# Patient Record
Sex: MALE | Race: WHITE
[De-identification: names, ages, dates, MRNs, and addresses within clinical notes are randomized per-mention and may not be internally consistent; named-entity substitution may affect disease eponyms.]

---

## 2018-04-28 ENCOUNTER — HOSPITAL ENCOUNTER (INPATIENT)
Dept: HOSPITAL 17 - PHED | Age: 43
LOS: 2 days | Discharge: HOME | DRG: 190 | End: 2018-04-30
Attending: HOSPITALIST | Admitting: HOSPITALIST
Payer: COMMERCIAL

## 2018-04-28 VITALS
HEART RATE: 120 BPM | OXYGEN SATURATION: 95 % | SYSTOLIC BLOOD PRESSURE: 134 MMHG | RESPIRATION RATE: 23 BRPM | DIASTOLIC BLOOD PRESSURE: 72 MMHG

## 2018-04-28 VITALS — BODY MASS INDEX: 19.75 KG/M2 | HEIGHT: 68 IN | WEIGHT: 130.29 LBS

## 2018-04-28 VITALS
RESPIRATION RATE: 28 BRPM | OXYGEN SATURATION: 90 % | DIASTOLIC BLOOD PRESSURE: 84 MMHG | TEMPERATURE: 98.5 F | SYSTOLIC BLOOD PRESSURE: 118 MMHG | HEART RATE: 130 BPM

## 2018-04-28 VITALS — OXYGEN SATURATION: 94 %

## 2018-04-28 VITALS — OXYGEN SATURATION: 95 %

## 2018-04-28 DIAGNOSIS — E87.2: ICD-10-CM

## 2018-04-28 DIAGNOSIS — G80.9: ICD-10-CM

## 2018-04-28 DIAGNOSIS — J44.0: ICD-10-CM

## 2018-04-28 DIAGNOSIS — J18.9: ICD-10-CM

## 2018-04-28 DIAGNOSIS — J96.01: ICD-10-CM

## 2018-04-28 DIAGNOSIS — J44.1: Primary | ICD-10-CM

## 2018-04-28 DIAGNOSIS — Z91.048: ICD-10-CM

## 2018-04-28 DIAGNOSIS — F17.210: ICD-10-CM

## 2018-04-28 DIAGNOSIS — E87.6: ICD-10-CM

## 2018-04-28 LAB
BASOPHILS # BLD AUTO: 0.1 TH/MM3 (ref 0–0.2)
BASOPHILS NFR BLD: 0.5 % (ref 0–2)
BUN SERPL-MCNC: 8 MG/DL (ref 7–18)
CALCIUM SERPL-MCNC: 8.7 MG/DL (ref 8.5–10.1)
CHLORIDE SERPL-SCNC: 102 MEQ/L (ref 98–107)
CREAT SERPL-MCNC: 1 MG/DL (ref 0.6–1.3)
D-DIMER: 1.12 MG/L FEU (ref 0–0.5)
EOSINOPHIL # BLD: 0.7 TH/MM3 (ref 0–0.4)
EOSINOPHIL NFR BLD: 4.1 % (ref 0–4)
ERYTHROCYTE [DISTWIDTH] IN BLOOD BY AUTOMATED COUNT: 13.1 % (ref 11.6–17.2)
GFR SERPLBLD BASED ON 1.73 SQ M-ARVRAT: 82 ML/MIN (ref 89–?)
GLUCOSE SERPL-MCNC: 134 MG/DL (ref 74–106)
HCO3 BLD-SCNC: 27.5 MEQ/L (ref 21–32)
HCT VFR BLD CALC: 43.9 % (ref 39–51)
HGB BLD-MCNC: 15.4 GM/DL (ref 13–17)
INR PPP: 1.1 RATIO
LACTIC ACID SEPSIS PROTOCOL: 2.2 MMOL/L (ref 0.4–2)
LYMPHOCYTES # BLD AUTO: 3.3 TH/MM3 (ref 1–4.8)
LYMPHOCYTES NFR BLD AUTO: 18 % (ref 9–44)
MAGNESIUM SERPL-MCNC: 2.1 MG/DL (ref 1.5–2.5)
MCH RBC QN AUTO: 32.7 PG (ref 27–34)
MCHC RBC AUTO-ENTMCNC: 35.2 % (ref 32–36)
MCV RBC AUTO: 92.9 FL (ref 80–100)
MONOCYTE #: 1.2 TH/MM3 (ref 0–0.9)
MONOCYTES NFR BLD: 6.7 % (ref 0–8)
NEUTROPHILS # BLD AUTO: 13 TH/MM3 (ref 1.8–7.7)
NEUTROPHILS NFR BLD AUTO: 70.7 % (ref 16–70)
PLATELET # BLD: 269 TH/MM3 (ref 150–450)
PMV BLD AUTO: 8.9 FL (ref 7–11)
PROTHROMBIN TIME: 11 SEC (ref 9.8–11.6)
RBC # BLD AUTO: 4.72 MIL/MM3 (ref 4.5–5.9)
SODIUM SERPL-SCNC: 137 MEQ/L (ref 136–145)
WBC # BLD AUTO: 18.3 TH/MM3 (ref 4–11)

## 2018-04-28 PROCEDURE — 80048 BASIC METABOLIC PNL TOTAL CA: CPT

## 2018-04-28 PROCEDURE — 85610 PROTHROMBIN TIME: CPT

## 2018-04-28 PROCEDURE — 71275 CT ANGIOGRAPHY CHEST: CPT

## 2018-04-28 PROCEDURE — 96368 THER/DIAG CONCURRENT INF: CPT

## 2018-04-28 PROCEDURE — 87804 INFLUENZA ASSAY W/OPTIC: CPT

## 2018-04-28 PROCEDURE — 94640 AIRWAY INHALATION TREATMENT: CPT

## 2018-04-28 PROCEDURE — 85730 THROMBOPLASTIN TIME PARTIAL: CPT

## 2018-04-28 PROCEDURE — 93005 ELECTROCARDIOGRAM TRACING: CPT

## 2018-04-28 PROCEDURE — 80053 COMPREHEN METABOLIC PANEL: CPT

## 2018-04-28 PROCEDURE — 83036 HEMOGLOBIN GLYCOSYLATED A1C: CPT

## 2018-04-28 PROCEDURE — 82805 BLOOD GASES W/O2 SATURATION: CPT

## 2018-04-28 PROCEDURE — 85025 COMPLETE CBC W/AUTO DIFF WBC: CPT

## 2018-04-28 PROCEDURE — 96365 THER/PROPH/DIAG IV INF INIT: CPT

## 2018-04-28 PROCEDURE — 85379 FIBRIN DEGRADATION QUANT: CPT

## 2018-04-28 PROCEDURE — 83605 ASSAY OF LACTIC ACID: CPT

## 2018-04-28 PROCEDURE — 36600 WITHDRAWAL OF ARTERIAL BLOOD: CPT

## 2018-04-28 PROCEDURE — 83735 ASSAY OF MAGNESIUM: CPT

## 2018-04-28 PROCEDURE — 87040 BLOOD CULTURE FOR BACTERIA: CPT

## 2018-04-28 PROCEDURE — 71046 X-RAY EXAM CHEST 2 VIEWS: CPT

## 2018-04-28 PROCEDURE — 83880 ASSAY OF NATRIURETIC PEPTIDE: CPT

## 2018-04-28 PROCEDURE — 94664 DEMO&/EVAL PT USE INHALER: CPT

## 2018-04-28 RX ADMIN — MAGNESIUM SULFATE IN DEXTROSE SCH MLS/HR: 10 INJECTION, SOLUTION INTRAVENOUS at 23:13

## 2018-04-28 RX ADMIN — ENOXAPARIN SODIUM SCH MG: 40 INJECTION SUBCUTANEOUS at 23:11

## 2018-04-28 RX ADMIN — IPRATROPIUM BROMIDE AND ALBUTEROL SULFATE SCH AMPULE: .5; 3 SOLUTION RESPIRATORY (INHALATION) at 22:17

## 2018-04-28 RX ADMIN — IPRATROPIUM BROMIDE AND ALBUTEROL SULFATE SCH AMPULE: .5; 3 SOLUTION RESPIRATORY (INHALATION) at 22:26

## 2018-04-28 RX ADMIN — MAGNESIUM SULFATE IN DEXTROSE SCH MLS/HR: 10 INJECTION, SOLUTION INTRAVENOUS at 22:02

## 2018-04-28 NOTE — EKG
Date Performed: 04/28/2018       Time Performed: 22:16:26

 

PTAGE:      42 years

 

EKG:      SINUS TACHYCARDIA POSSIBLE LEFT ATRIAL ENLARGEMENT PATTERN CONSISTENT WITH PULMONARY DISEAS
E POSSIBLE RIGHT VENTRICULAR HYPERTROPHY ABNORMAL ECG

 

PREVIOUS TRACING       : 03/31/2016 10.30 Since the previous tracing, no significant change noted

 

DOCTOR:   Clark Mukherjee  Interpretating Date/Time  04/28/2018 23:54:06

## 2018-04-28 NOTE — RADRPT
EXAM DATE/TIME:  04/28/2018 21:46 

 

[facility] COMPARISON:     

CHEST PA & LAT, March 31, 2016, 10:42.

 

                     

INDICATIONS :     

Short of breath.

                     

 

MEDICAL HISTORY :            

Cerebral palsy   

 

SURGICAL HISTORY :     

None.   

 

ENCOUNTER:     

Initial                                        

 

ACUITY:     

2 days      

 

PAIN SCORE:     

0/10

 

LOCATION:     

Bilateral chest 

 

FINDINGS:     

PA and lateral views of the chest demonstrate bibasilar patchy densities. Right upper lobe nodule par
tially calcified. Heart normal size. Osseous structures are intact.

 

CONCLUSION:     

1. Bibasilar infiltrates. Treatment and followup to resolution.

 

 

 

 Abelardo Burch MD on April 28, 2018 at 22:04           

Board Certified Radiologist.

 This report was verified electronically.

## 2018-04-28 NOTE — PD
HPI


Chief Complaint:  Respiratory Symptoms


Time Seen by Provider:  21:33


Travel History


International Travel<30 days:  No


Contact w/Intl Traveler<30days:  No


Traveled to known affect area:  No





History of Present Illness


HPI


Patient is a 42-year-old male who presents the emergency room with complaints 

of shortness of breath.  Patient reports that for the past 2 days, he has been 

having a nonproductive cough, reports that he has been wheezing.  Patient 

reports that symptoms were exacerbated tonight, he decided to call ems for 

help.  EMS reports that patient was hypoxic and tachypneic on initial evaluation

, patient was given Solu-Medrol 125 mg IM as well as 3 neb treatments prior to 

arrival to the emergency room. His initial pulse ox was 80% on room air by EMS.

   Patient reports that he is a smoker, reports that he has not smoked in the 

past 2 days due to the shortness of breath.  Patient denies any fevers or chills

, denies any history of pulmonary embolism or DVT.  Reports no sick contacts.





PFSH


Past Medical History


Cerebral Palsy:  Yes


Diabetes:  No


Diminished Hearing:  No


Tetanus Vaccination:  < 5 Years


Influenza Vaccination:  Yes





Past Surgical History


Other Surgery:  Yes (BUNION SURGERY)





Social History


Alcohol Use:  No


Tobacco Use:  Yes (1 ppd)


Substance Use:  No





Allergies-Medications


(Allergen,Severity, Reaction):  


Coded Allergies:  


     No Known Allergies (Verified  Allergy, Unknown, 4/28/18)


Uncoded Allergies:  


     NICKEL (Allergy, Intermediate, BLISTERS/HIVES, 3/31/16)


Reported Meds & Prescriptions





Reported Meds & Active Scripts


Active


No Active Prescriptions or Reported Medications    








Review of Systems


General / Constitutional:  No: Fever, Chills


Eyes:  No: Visual changes


HENT:  No: Headaches


Cardiovascular:  No: Chest Pain or Discomfort


Respiratory:  Positive: Cough, Shortness of Breath, Wheezing


Gastrointestinal:  No: Abdominal Pain


Genitourinary:  No: Dysuria


Musculoskeletal:  No: Pain


Skin:  No Rash


Neurologic:  No: Weakness


Psychiatric:  No: Depression


Endocrine:  No: Polydipsia


Hematologic/Lymphatic:  No: Easy Bruising





Physical Exam


Narrative


GENERAL: Moderate distress


SKIN: Focused skin assessment warm/dry.


HEAD: Atraumatic. Normocephalic. 


EYES: Pupils equal and round. No scleral icterus. No injection or drainage. 


ENT: No nasal bleeding or discharge.  Mucous membranes pink and moist.


NECK: Trachea midline. No JVD. 


CARDIOVASCULAR: Tachycardic.  No murmur appreciated.


RESPIRATORY: Positive accessory muscle use.  Scattered wheezing diffusely


GASTROINTESTINAL: Abdomen soft, non-tender, nondistended. Hepatic and splenic 

margins not palpable. 


MUSCULOSKELETAL: No obvious deformities. No clubbing.  No cyanosis.  No edema. 


NEUROLOGICAL: Awake and alert. No obvious cranial nerve deficits.  Motor 

grossly within normal limits. Normal speech.


PSYCHIATRIC: Appropriate mood and affect; insight and judgment normal.





Data


Data


Last Documented VS





Vital Signs








  Date Time  Temp Pulse Resp B/P (MAP) Pulse Ox O2 Delivery O2 Flow Rate FiO2


 


4/28/18 22:18     94 Nasal Cannula 4.00 


 


4/28/18 21:33 98.5 130 28 118/84 (95)    








Orders





 Orders


Complete Blood Count With Diff (4/28/18 21:38)


Basic Metabolic Panel (Bmp) (4/28/18 21:38)


B-Type Natriuretic Peptide (4/28/18 21:38)


D-Dimer (4/28/18 21:38)


Act Partial Throm Time (Ptt) (4/28/18 21:38)


Prothrombin Time / Inr (Pt) (4/28/18 21:38)


Magnesium (Mg) (4/28/18 21:38)


Arterial Blood Gas (Abg) (4/28/18 21:38)


Influenzae A/B Antigen (4/28/18 21:38)


Iv Access Insert/Monitor (4/28/18 21:38)


Electrocardiogram (4/28/18 21:38)


Ecg Monitoring (4/28/18 21:38)


Oximetry (4/28/18 21:38)


Chest, Pa & Lat (4/28/18 21:38)


Sodium Chloride 0.9% Flush (Ns Flush) (4/28/18 21:45)


Albuterol-Ipratropium Neb (Duoneb Neb) (4/28/18 21:45)


Sodium Chlor 0.9% 1000 Ml Inj (Ns 1000 M (4/28/18 21:45)


Blood Culture (4/28/18 21:38)


Lactic Acid Sepsis Protocol (4/28/18 21:40)


Magnesium Sulfate 1 Gm Premix (Magnesium (4/28/18 22:00)


Azithromycin Inj (Zithromax Inj) (4/28/18 22:15)


Ceftriaxone Inj (Rocephin Inj) (4/28/18 22:15)


Sodium Chlor 0.9% 1000 Ml Inj (Ns 1000 M (4/28/18 22:30)


Sepsis Workup Initiated (4/28/18 )


Potassium Chloride (Kcl) (4/28/18 22:45)


Kcl Bolus Inj (4/28/18 22:45)





Labs





Laboratory Tests








Test


  4/28/18


21:45 4/28/18


21:55


 


White Blood Count 18.3 TH/MM3  


 


Red Blood Count 4.72 MIL/MM3  


 


Hemoglobin 15.4 GM/DL  


 


Hematocrit 43.9 %  


 


Mean Corpuscular Volume 92.9 FL  


 


Mean Corpuscular Hemoglobin 32.7 PG  


 


Mean Corpuscular Hemoglobin


Concent 35.2 % 


  


 


 


Red Cell Distribution Width 13.1 %  


 


Platelet Count 269 TH/MM3  


 


Mean Platelet Volume 8.9 FL  


 


Neutrophils (%) (Auto) 70.7 %  


 


Lymphocytes (%) (Auto) 18.0 %  


 


Monocytes (%) (Auto) 6.7 %  


 


Eosinophils (%) (Auto) 4.1 %  


 


Basophils (%) (Auto) 0.5 %  


 


Neutrophils # (Auto) 13.0 TH/MM3  


 


Lymphocytes # (Auto) 3.3 TH/MM3  


 


Monocytes # (Auto) 1.2 TH/MM3  


 


Eosinophils # (Auto) 0.7 TH/MM3  


 


Basophils # (Auto) 0.1 TH/MM3  


 


CBC Comment DIFF FINAL  


 


Differential Comment   


 


Blood Urea Nitrogen 8 MG/DL  


 


Creatinine 1.00 MG/DL  


 


Random Glucose 134 MG/DL  


 


Calcium Level 8.7 MG/DL  


 


Magnesium Level 2.1 MG/DL  


 


Sodium Level 137 MEQ/L  


 


Potassium Level 2.7 MEQ/L  


 


Chloride Level 102 MEQ/L  


 


Carbon Dioxide Level 27.5 MEQ/L  


 


Anion Gap 8 MEQ/L  


 


Estimat Glomerular Filtration


Rate 82 ML/MIN 


  


 


 


Lactic Acid Level 2.2 mmol/L  


 


B-Type Natriuretic Peptide 18 PG/ML  


 


Blood Gas Puncture Site  RT BRACHIAL 


 


Blood Gas Patient Temperature  98.6 


 


Blood Gas HCO3  24 mmol/L 


 


Blood Gas Base Excess  0.8 mmol/L 


 


Blood Gas Oxygen Saturation  87 % 


 


Arterial Blood pH  7.46 


 


Arterial Blood Partial


Pressure CO2 


  34 mmHG 


 


 


Arterial Blood Partial


Pressure O2 


  55 mmHG 


 


 


Arterial Blood Oxygen Content  19.3 Vol % 


 


Arterial Blood


Carboxyhemoglobin 


  1.9 % 


 


 


Arterial Blood Methemoglobin  1.0 % 


 


Blood Gas Hemoglobin  15.8 G/DL 


 


Blood Gas Inspired Oxygen  21 % 











MDM


Medical Decision Making


Medical Screen Exam Complete:  Yes


Emergency Medical Condition:  Yes


Medical Record Reviewed:  Yes


Interpretation(s)


EKG at 2216: Sinus tach at 116bpm, qt/qtc: 327/396,





Vital Signs








  Date Time  Temp Pulse Resp B/P (MAP) Pulse Ox O2 Delivery O2 Flow Rate FiO2


 


4/28/18 21:36      Nasal Cannula 4.00 


 


4/28/18 21:33 98.5 130 28 118/84 (95) 90   








Differential Diagnosis


Pneumonia, COPD exacerbation, influenza, pulmonary embolism, pneumothorax


Narrative Course


42-year-old male who presents the emergency room complaints of shortness of 

breath, cough and wheezing for the past 2 days.





Patient arrives to the emergency room tachycardic with heart rate in the 130s, 

tachypneic with a respiratory rate of 28, hypoxic with a pulse ox of 90% on 4 L 

nasal cannula.


Patient does meet SIRS criteria, blood cultures, lactic acid as well as IV 

fluids were ordered.





During the course of the patients emergency department visit, the patients 

history, examination, and differential diagnosis were reviewed with the 

patient. The patient was placed on a cardiac monitor with oximetry and frequent 

blood pressure monitoring. The patient had an IV access obtained and blood work 

sent for analysis. 





The patient was initially provided IM Solu-Medrol by EMS, 3 neb treatments by 

EMS.





Patient will receive 3 DuoNeb's in the emergency room as well as IV magnesium.





The patients laboratory studies were reviewed and remarkable for





CBC & BMP Diagram


4/28/18 21:45








Calcium Level 8.7, Magnesium Level 2.1





lactate 2.2





Radiology studies were reviewed and remarkable for 








Last Impressions








Chest X-Ray 4/28/18 2138 Signed





Impressions: 





 Service Date/Time:  Saturday, April 28, 2018 21:46 - CONCLUSION:  1. Bibasilar 





 infiltrates. Treatment and followup to resolution.     Abelardo Burch MD 





Patient with bilateral infiltrates, he has a lactic acid of 2.2, a white blood 

cell count of 18.3, he is hypoxic with a pulse ox of 80% on room air, ABG shows 

that his pO2 55.  





patient will require admission to the hospital for iv antibiotics.  He has 

received a dose of IV Rocephin as well as IV azithromycin.  Patient is 

agreeable to admission to hospital.


Critical Care Narrative


Aggregate critical care time was 30 minutes. Time to perform other separately 

billable procedures was not  


included in the critical care time. My time did not include minutes spent 

treating any other patients simultaneously or on  


activities that did not directly contribute to the patient's treatment.  





The services I provided to this patient were to treat and/or prevent clinically 

significant deterioration that could result  


in:  death, decompensation, deterioration





I provided critical care services requiring my management, as noted below:


Chart data review, documentation time, medication orders and management, vital 

sign assessments/reviewing monitor data,  


ordering and reviewing lab tests, ordering and interpreting/reviewing x-rays 

and diagnostic studies, care of the patient  


and discussion of the patient with the admitting physicians.





Sepsis Criteria


SIRS Criteria (2 or more):  Heart rate over 90, RR  > 20 or PaCO2 < 32





Diagnosis





 Primary Impression:  


 COPD exacerbation


 Additional Impressions:  


 Hypoxia


 Hypokalemia





Admitting Information


Admitting Physician Requests:  Admit


Scripts


No Active Prescriptions or Reported Tia Kerns DO Apr 28, 2018 21:51

## 2018-04-29 VITALS — DIASTOLIC BLOOD PRESSURE: 69 MMHG | SYSTOLIC BLOOD PRESSURE: 102 MMHG

## 2018-04-29 VITALS — RESPIRATION RATE: 28 BRPM | HEART RATE: 88 BPM | DIASTOLIC BLOOD PRESSURE: 76 MMHG | SYSTOLIC BLOOD PRESSURE: 119 MMHG

## 2018-04-29 VITALS — HEART RATE: 84 BPM | DIASTOLIC BLOOD PRESSURE: 76 MMHG | SYSTOLIC BLOOD PRESSURE: 119 MMHG | RESPIRATION RATE: 28 BRPM

## 2018-04-29 VITALS — HEART RATE: 88 BPM | OXYGEN SATURATION: 95 % | RESPIRATION RATE: 29 BRPM

## 2018-04-29 VITALS
TEMPERATURE: 97.8 F | OXYGEN SATURATION: 98 % | RESPIRATION RATE: 22 BRPM | DIASTOLIC BLOOD PRESSURE: 68 MMHG | HEART RATE: 92 BPM | SYSTOLIC BLOOD PRESSURE: 108 MMHG

## 2018-04-29 VITALS
SYSTOLIC BLOOD PRESSURE: 117 MMHG | HEART RATE: 132 BPM | DIASTOLIC BLOOD PRESSURE: 67 MMHG | RESPIRATION RATE: 30 BRPM | OXYGEN SATURATION: 93 % | TEMPERATURE: 98.6 F

## 2018-04-29 VITALS — RESPIRATION RATE: 29 BRPM | HEART RATE: 94 BPM

## 2018-04-29 VITALS
DIASTOLIC BLOOD PRESSURE: 49 MMHG | OXYGEN SATURATION: 97 % | RESPIRATION RATE: 28 BRPM | SYSTOLIC BLOOD PRESSURE: 86 MMHG | HEART RATE: 102 BPM

## 2018-04-29 VITALS
TEMPERATURE: 97.8 F | HEART RATE: 100 BPM | SYSTOLIC BLOOD PRESSURE: 124 MMHG | RESPIRATION RATE: 24 BRPM | OXYGEN SATURATION: 97 % | DIASTOLIC BLOOD PRESSURE: 75 MMHG

## 2018-04-29 VITALS
SYSTOLIC BLOOD PRESSURE: 116 MMHG | DIASTOLIC BLOOD PRESSURE: 63 MMHG | RESPIRATION RATE: 28 BRPM | OXYGEN SATURATION: 93 % | HEART RATE: 98 BPM

## 2018-04-29 VITALS — HEART RATE: 112 BPM

## 2018-04-29 VITALS
DIASTOLIC BLOOD PRESSURE: 48 MMHG | HEART RATE: 100 BPM | RESPIRATION RATE: 29 BRPM | OXYGEN SATURATION: 96 % | SYSTOLIC BLOOD PRESSURE: 102 MMHG

## 2018-04-29 VITALS — OXYGEN SATURATION: 98 % | RESPIRATION RATE: 19 BRPM | HEART RATE: 86 BPM

## 2018-04-29 VITALS
SYSTOLIC BLOOD PRESSURE: 114 MMHG | DIASTOLIC BLOOD PRESSURE: 68 MMHG | RESPIRATION RATE: 26 BRPM | HEART RATE: 90 BPM | OXYGEN SATURATION: 95 %

## 2018-04-29 VITALS — HEART RATE: 100 BPM

## 2018-04-29 VITALS — DIASTOLIC BLOOD PRESSURE: 65 MMHG | SYSTOLIC BLOOD PRESSURE: 109 MMHG | RESPIRATION RATE: 27 BRPM | HEART RATE: 104 BPM

## 2018-04-29 VITALS — OXYGEN SATURATION: 95 %

## 2018-04-29 VITALS — OXYGEN SATURATION: 93 %

## 2018-04-29 VITALS — OXYGEN SATURATION: 98 %

## 2018-04-29 LAB
ALBUMIN SERPL-MCNC: 2.9 GM/DL (ref 3.4–5)
ALP SERPL-CCNC: 77 U/L (ref 45–117)
ALT SERPL-CCNC: 15 U/L (ref 12–78)
AST SERPL-CCNC: 18 U/L (ref 15–37)
BASOPHILS # BLD AUTO: 0.1 TH/MM3 (ref 0–0.2)
BASOPHILS NFR BLD: 0.3 % (ref 0–2)
BILIRUB SERPL-MCNC: 0.4 MG/DL (ref 0.2–1)
BUN SERPL-MCNC: 8 MG/DL (ref 7–18)
CALCIUM SERPL-MCNC: 8 MG/DL (ref 8.5–10.1)
CHLORIDE SERPL-SCNC: 109 MEQ/L (ref 98–107)
CREAT SERPL-MCNC: 1.2 MG/DL (ref 0.6–1.3)
EOSINOPHIL # BLD: 0 TH/MM3 (ref 0–0.4)
EOSINOPHIL NFR BLD: 0.1 % (ref 0–4)
ERYTHROCYTE [DISTWIDTH] IN BLOOD BY AUTOMATED COUNT: 12.7 % (ref 11.6–17.2)
GFR SERPLBLD BASED ON 1.73 SQ M-ARVRAT: 66 ML/MIN (ref 89–?)
GLUCOSE SERPL-MCNC: 194 MG/DL (ref 74–106)
HBA1C MFR BLD: 4.7 % (ref 4.3–6)
HCO3 BLD-SCNC: 22.9 MEQ/L (ref 21–32)
HCT VFR BLD CALC: 39.4 % (ref 39–51)
HGB BLD-MCNC: 13.3 GM/DL (ref 13–17)
LACTIC ACID SEPSIS PROTOCOL: 3 MMOL/L (ref 0.4–2)
LYMPHOCYTES # BLD AUTO: 0.6 TH/MM3 (ref 1–4.8)
LYMPHOCYTES NFR BLD AUTO: 3.2 % (ref 9–44)
MCH RBC QN AUTO: 31.8 PG (ref 27–34)
MCHC RBC AUTO-ENTMCNC: 33.8 % (ref 32–36)
MCV RBC AUTO: 94.1 FL (ref 80–100)
MONOCYTE #: 0.3 TH/MM3 (ref 0–0.9)
MONOCYTES NFR BLD: 1.6 % (ref 0–8)
NEUTROPHILS # BLD AUTO: 17.7 TH/MM3 (ref 1.8–7.7)
NEUTROPHILS NFR BLD AUTO: 94.8 % (ref 16–70)
PLATELET # BLD: 222 TH/MM3 (ref 150–450)
PMV BLD AUTO: 8.6 FL (ref 7–11)
PROT SERPL-MCNC: 6.4 GM/DL (ref 6.4–8.2)
RBC # BLD AUTO: 4.19 MIL/MM3 (ref 4.5–5.9)
SODIUM SERPL-SCNC: 140 MEQ/L (ref 136–145)
WBC # BLD AUTO: 18.7 TH/MM3 (ref 4–11)

## 2018-04-29 RX ADMIN — IPRATROPIUM BROMIDE AND ALBUTEROL SULFATE SCH AMPULE: .5; 3 SOLUTION RESPIRATORY (INHALATION) at 11:19

## 2018-04-29 RX ADMIN — PROMETHAZINE HYDROCHLORIDE AND CODEINE PHOSPHATE PRN ML: 10; 6.25 SOLUTION ORAL at 22:48

## 2018-04-29 RX ADMIN — IPRATROPIUM BROMIDE AND ALBUTEROL SULFATE SCH AMPULE: .5; 3 SOLUTION RESPIRATORY (INHALATION) at 15:58

## 2018-04-29 RX ADMIN — POTASSIUM CHLORIDE SCH MLS/HR: 200 INJECTION, SOLUTION INTRAVENOUS at 03:49

## 2018-04-29 RX ADMIN — PROMETHAZINE HYDROCHLORIDE AND CODEINE PHOSPHATE PRN ML: 10; 6.25 SOLUTION ORAL at 16:33

## 2018-04-29 RX ADMIN — STANDARDIZED SENNA CONCENTRATE AND DOCUSATE SODIUM SCH TAB: 8.6; 5 TABLET, FILM COATED ORAL at 20:43

## 2018-04-29 RX ADMIN — GUAIFENESIN SCH MG: 600 TABLET, EXTENDED RELEASE ORAL at 08:45

## 2018-04-29 RX ADMIN — Medication SCH ML: at 20:44

## 2018-04-29 RX ADMIN — PROMETHAZINE HYDROCHLORIDE AND CODEINE PHOSPHATE PRN ML: 10; 6.25 SOLUTION ORAL at 19:46

## 2018-04-29 RX ADMIN — POTASSIUM CHLORIDE SCH MLS/HR: 200 INJECTION, SOLUTION INTRAVENOUS at 00:45

## 2018-04-29 RX ADMIN — IPRATROPIUM BROMIDE AND ALBUTEROL SULFATE SCH AMPULE: .5; 3 SOLUTION RESPIRATORY (INHALATION) at 07:20

## 2018-04-29 RX ADMIN — METHYLPREDNISOLONE SODIUM SUCCINATE SCH MG: 40 INJECTION, POWDER, FOR SOLUTION INTRAMUSCULAR; INTRAVENOUS at 06:30

## 2018-04-29 RX ADMIN — METHYLPREDNISOLONE SODIUM SUCCINATE SCH MG: 40 INJECTION, POWDER, FOR SOLUTION INTRAMUSCULAR; INTRAVENOUS at 03:05

## 2018-04-29 RX ADMIN — GUAIFENESIN SCH MG: 600 TABLET, EXTENDED RELEASE ORAL at 20:43

## 2018-04-29 RX ADMIN — ENOXAPARIN SODIUM SCH MG: 40 INJECTION SUBCUTANEOUS at 21:51

## 2018-04-29 RX ADMIN — METHYLPREDNISOLONE SODIUM SUCCINATE SCH MG: 40 INJECTION, POWDER, FOR SOLUTION INTRAMUSCULAR; INTRAVENOUS at 12:00

## 2018-04-29 RX ADMIN — IPRATROPIUM BROMIDE AND ALBUTEROL SULFATE SCH AMPULE: .5; 3 SOLUTION RESPIRATORY (INHALATION) at 19:20

## 2018-04-29 RX ADMIN — STANDARDIZED SENNA CONCENTRATE AND DOCUSATE SODIUM SCH TAB: 8.6; 5 TABLET, FILM COATED ORAL at 08:45

## 2018-04-29 RX ADMIN — POTASSIUM CHLORIDE SCH MLS/HR: 200 INJECTION, SOLUTION INTRAVENOUS at 01:34

## 2018-04-29 RX ADMIN — Medication SCH ML: at 08:45

## 2018-04-29 RX ADMIN — METHYLPREDNISOLONE SODIUM SUCCINATE SCH MG: 40 INJECTION, POWDER, FOR SOLUTION INTRAMUSCULAR; INTRAVENOUS at 22:48

## 2018-04-29 RX ADMIN — METHYLPREDNISOLONE SODIUM SUCCINATE SCH MG: 40 INJECTION, POWDER, FOR SOLUTION INTRAMUSCULAR; INTRAVENOUS at 18:00

## 2018-04-29 NOTE — RADRPT
EXAM DATE/TIME:  04/28/2018 23:49 

 

[facility] COMPARISON:     

No previous studies available for comparison.

 

 

INDICATIONS :     

Short of breath.  Cough.

                      

 

IV CONTRAST:     

75 cc Omnipaque 350 (iohexol) IV 

 

 

RADIATION DOSE:     

8.75 CTDIvol (mGy) 

 

 

MEDICAL HISTORY :       

Cerebral palsy.

 

SURGICAL HISTORY :      

None. 

 

ENCOUNTER:      

Initial

 

ACUITY:      

2 days

 

PAIN SCALE:      

0/10

 

LOCATION:        

chest 

 

TECHNIQUE:     

Volumetric scanning of the chest was performed using a pulmonary embolism protocol MIP images were re
constructed.  Using automated exposure control and adjustment of the mA and/or kV according to patien
t size, radiation dose was kept as low as reasonably achievable to obtain optimal diagnostic quality 
images.   DICOM format image data is available electronically for review and comparison.  

 

Follow-up recommendations for detected pulmonary nodules are based at a minimum on nodule size and pa
tient risk factors according to Fleischner Society Guidelines.

 

FINDINGS:     

 

PULMONARY ARTERIES:

No filling defects are seen in the pulmonary arteries through the segmental level.

 

LUNGS:     

There is scattered areas of increased ground substance located in the medial right upper lung, lingul
a, and medial basal segment left lower lung.  No focal areas of consolidation.  Some mild nodularity 
of the bronchopulmonary markings of the posterior segment right upper lobe.

 

PLEURAE:     

There is no pleural thickening or pleural effusion.

 

MEDIASTINUM:     

There is good visualization of the great vessels of the middle mediastinum.  No evidence of mediastin
al or hilar adenopathy/mass.

 

CONCLUSION:     

1. The study is negative for pulmonary embolism.

2. Scattered areas of increased ground substance of the pulmonary parenchyma and a few small areas of
 nodular thickening of the bronchopulmonary markings in the posterior segment of the right upper lobe
 suggesting infectious or inflammatory process.  No focal areas of consolidation and no focal infiltr
ates seen.

 

 

 

 

 Rudi Gant MD on April 29, 2018 at 0:11           

Board Certified Radiologist.

 This report was verified electronically.

## 2018-04-29 NOTE — HHI.HP
__________________________________________________





[facility]


Service


[facility]


Primary Care Physician


No Primary Care Physician


Admission Diagnosis





Hypoxia, pneumonia, copd exacerbation


Diagnoses:  


Chief Complaint:  


Shortness of breath


Travel History


International Travel<30 Days:  No


Contact w/Intl Traveler <30 Da:  No


Traveled to Known Affected Are:  No


History of Present Illness


42-year-old white male being admitted for acute hypoxic respiratory failure





Patient was in his usual state of health until about 3 days ago when he began 

experiencing intermittent bouts of intensive coughing that was wet but not 

productive.  Reports that he would wake up with these episodes of intense 

coughing which would then ease throughout the rest of the day.  However since 

last night this episode was severe enough to the point where he got very 

lightheaded and almost passed out with his coughing spell and he called 911.  

He does endorse some posttussive unremarkable emesis and posttussive chest pain 

as well as rhinorrhea this week.  Denies any cyanosis.  Patient did say he took 

some amoxicillin given to him by his father to no avail, also took some 

Benadryl as well to try to help control his symptoms.





 on the way to the emergency department per the ER records patient was noted to 

have sats in the 80s via EMS.  CT angiogram was performed which I independently 

reviewed which shows no pulmonary embolus but it does show patchy infiltrates 

on the right lung field.  Patient was also noted to be hypokalemic in the ER 

around 2.7.  Was given IV fluids steroids and antibiotics and supplemental 

oxygen.





Review of Systems


Except as stated in HPI:  all other systems reviewed are Neg





Past Family Social History


Past Medical History


Cerebral palsy


Hospitalization for pneumonia past


Allergies:  


Coded Allergies:  


     No Known Allergies (Verified  Allergy, Unknown, 18)


Uncoded Allergies:  


     NICKEL (Allergy, Intermediate, BLISTERS/HIVES, 3/31/16)


Family History


Heart attacks in father and grandfather


Social History


Used to be a heavy drinker, says he quit in , does actively smoke since 

teenage years





Physical Exam


Vital Signs





Vital Signs








  Date Time  Temp Pulse Resp B/P (MAP) Pulse Ox O2 Delivery O2 Flow Rate FiO2


 


18 07:25     98 Nasal Cannula 4.00 


 


4/29/18 04:00  92      


 


18 04:00 97.8 92 22 108/68 (81) 98   


 


18 02:00  100      


 


18 01:30 97.8 100 24 124/75 (91) 97   


 


18 00:45  112 20 102/69 (80) 93 Nasal Cannula 4.00 


 


18 23:53        


 


18 23:24  120 23 134/72 (92) 95 Nasal Cannula 4.00 


 


18 22:18     94 Nasal Cannula 4.00 


 


18 21:53     95 Nasal Cannula 4.00 


 


18 21:36      Nasal Cannula 4.00 


 


18 21:33 98.5 130 28 118/84 (95) 90   








Physical Exam


VS: afebrile


GENERAL: Middle-aged male who appears older than his stated age, in no acute 

distress


SKIN: Warm and dry.


EYES: No scleral icterus. No injection or drainage. 


ENT: No nasal bleeding or discharge.  Mucous membranes pink and moist.  Poor 

dentition


CARDIOVASCULAR: Regular rate and rhythm.  no murmurs


RESPIRATORY: No accessory muscle use.  Diffuse rhonchi and wheezing all 

throughout lung fields


GASTROINTESTINAL: Abdomen soft, non-tender, nondistended. 


Extremities: No clubbing, cyanosis, or edema. No obvious deformities. 


MUSCULOSKELETAL: adequate muscle bulk and tone for age and habitus


NEUROLOGICAL: Awake and alert. No obvious cranial nerve deficits.  No facial 

droop nor slurred speech noted.


PSYCHIATRIC: Appropriate mood and affect; insight and judgment normal.


Laboratory





Laboratory Tests








Test


  18


21:45 18


21:55 18


05:32 18


07:41


 


White Blood Count 18.3   18.7  


 


Red Blood Count 4.72   4.19  


 


Hemoglobin 15.4   13.3  


 


Hematocrit 43.9   39.4  


 


Mean Corpuscular Volume 92.9   94.1  


 


Mean Corpuscular Hemoglobin 32.7   31.8  


 


Mean Corpuscular Hemoglobin


Concent 35.2 


  


  33.8 


  


 


 


Red Cell Distribution Width 13.1   12.7  


 


Platelet Count 269   222  


 


Mean Platelet Volume 8.9   8.6  


 


Neutrophils (%) (Auto) 70.7   94.8  


 


Lymphocytes (%) (Auto) 18.0   3.2  


 


Monocytes (%) (Auto) 6.7   1.6  


 


Eosinophils (%) (Auto) 4.1   0.1  


 


Basophils (%) (Auto) 0.5   0.3  


 


Neutrophils # (Auto) 13.0   17.7  


 


Lymphocytes # (Auto) 3.3   0.6  


 


Monocytes # (Auto) 1.2   0.3  


 


Eosinophils # (Auto) 0.7   0.0  


 


Basophils # (Auto) 0.1   0.1  


 


CBC Comment DIFF FINAL   DIFF FINAL  


 


Differential Comment      


 


Prothrombin Time 11.0    


 


Prothromb Time International


Ratio 1.1 


  


  


  


 


 


Activated Partial


Thromboplast Time 28.2 


  


  


  


 


 


D-Dimer Quantitative (PE/DVT) 1.12    


 


Blood Urea Nitrogen 8   8  


 


Creatinine 1.00   1.20  


 


Random Glucose 134   194  


 


Calcium Level 8.7   8.0  


 


Magnesium Level 2.1    


 


Sodium Level 137   140  


 


Potassium Level 2.7   3.5  


 


Chloride Level 102   109  


 


Carbon Dioxide Level 27.5   22.9  


 


Anion Gap 8   8  


 


Estimat Glomerular Filtration


Rate 82 


  


  66 


  


 


 


Lactic Acid Level 2.2    3.0 


 


B-Type Natriuretic Peptide 18    


 


Blood Gas Puncture Site  RT BRACHIAL   


 


Blood Gas Patient Temperature  98.6   


 


Blood Gas HCO3  24   


 


Blood Gas Base Excess  0.8   


 


Blood Gas Oxygen Saturation  87   


 


Arterial Blood pH  7.46   


 


Arterial Blood Partial


Pressure CO2 


  34 


  


  


 


 


Arterial Blood Partial


Pressure O2 


  55 


  


  


 


 


Arterial Blood Oxygen Content  19.3   


 


Arterial Blood


Carboxyhemoglobin 


  1.9 


  


  


 


 


Arterial Blood Methemoglobin  1.0   


 


Blood Gas Hemoglobin  15.8   


 


Blood Gas Inspired Oxygen  21   


 


Total Protein   6.4  


 


Albumin   2.9  


 


Alkaline Phosphatase   77  


 


Aspartate Amino Transf


(AST/SGOT) 


  


  18 


  


 


 


Alanine Aminotransferase


(ALT/SGPT) 


  


  15 


  


 


 


Total Bilirubin   0.4  














 Date/Time


Source Procedure


Growth Status


 


 


 18 21:50


Blood Peripheral Aerobic Blood Culture


Pending Received


 


 18 21:50


Blood Peripheral Anaerobic Blood Culture


Pending Received


 


 18 21:50


Nasal Aspirate Influenza Types A,B Antigen (ANNE) - Final


NEGATIVE FOR FLU A AND B ANTIGEN.... Complete








Result Diagram:  


18 0532                                                                   

             18 0532





Imaging





Last Impressions








CT Angiography 18 1888 Signed





Impressions: 





 Service Date/Time:  2018 23:49 - CONCLUSION:  1. The study 





 is negative for pulmonary embolism. 2. Scattered areas of increased ground 





 substance of the pulmonary parenchyma and a few small areas of nodular 





 thickening of the bronchopulmonary markings in the posterior segment of the 





 right upper lobe suggesting infectious or inflammatory process.  No focal 

areas 





 of consolidation and no focal infiltrates seen.      Rudi Gant MD 


 


Chest X-Ray 18 Signed





Impressions: 





 Service Date/Time:  2018 21:46 - CONCLUSION:  1. Bibasilar 





 infiltrates. Treatment and followup to resolution.     Eric F. Tocci, MD Caprini VTE Risk Assessment


Caprini VTE Risk Assessment:  Mod/High Risk (score >= 2)


Caprini Risk Assessment Model











 Point Value = 1          Point Value = 2  Point Value = 3  Point Value = 5


 


Age 41-60


Minor surgery


BMI > 25 kg/m2


Swollen legs


Varicose veins


Pregnancy or postpartum


History of unexplained or recurrent


   spontaneous 


Oral contraceptives or hormone


   replacement


Sepsis (< 1 month)


Serious lung disease, including


   pneumonia (< 1 month)


Abnormal pulmonary function


Acute myocardial infarction


Congestive heart failure (< 1 month)


History of inflammatory bowel disease


Medical patient at bed rest Age 61-74


Arthroscopic surgery


Major open surgery (> 45 min)


Laparoscopic surgery (> 45 min)


Malignancy


Confined to bed (> 72 hours)


Immobilizing plaster cast


Central venous access Age >= 75


History of VTE


Family history of VTE


Factor V Leiden


Prothrombin 19300F


Lupus anticoagulant


Anticardiolipin antibodies


Elevated serum homocysteine


Heparin-induced thrombocytopenia


Other congenital or acquired


   thrombophilia Stroke (< 1 month)


Elective arthroplasty


Hip, pelvis, or leg fracture


Acute spinal cord injury (< 1 month)








Prophylaxis Regimen











   Total Risk


Factor Score Risk Level Prophylaxis Regimen


 


0-1      Low Early ambulation


 


2 Moderate Order ONE of the following:


*Sequential Compression Device (SCD)


*Heparin 5000 units SQ BID


 


3-4 Higher Order ONE of the following medications:


*Heparin 5000 units SQ TID


*Enoxaparin/Lovenox 40 mg SQ daily (WT < 150 kg, CrCl > 30 mL/min)


*Enoxaparin/Lovenox 30 mg SQ daily (WT < 150 kg, CrCl > 10-29 mL/min)


*Enoxaparin/Lovenox 30 mg SQ BID (WT < 150 kg, CrCl > 30 mL/min)


AND/OR


*Sequential Compression Device (SCD)


 


5 or more Highest Order ONE of the following medications:


*Heparin 5000 units SQ TID (Preferred with Epidurals)


*Enoxaparin/Lovenox 40 mg SQ daily (WT < 150 kg, CrCl > 30 mL/min)


*Enoxaparin/Lovenox 30 mg SQ daily (WT < 150 kg, CrCl > 10-29 mL/min)


*Enoxaparin/Lovenox 30 mg SQ BID (WT < 150 kg, CrCl > 30 mL/min)


AND


*Sequential Compression Device (SCD)











Assessment and Plan


Assessment and Plan


42-year-old white male admitted for acute hypoxic respiratory failure





Acute hypoxic respiratory failure


-CT angios negative for PE, suggestive of infiltrates, blood gases suggestive 

of acute hypoxica, low normal CO2


-Azithromycin and Rocephin


-Solu-Medrol and duo nebs


-Supplemental O2, wean as tolerated





lactic acidosis


- 2/2 hypoxia, IVFs, 





Lovenox





Physician Certification


2 Midnight Certification Type:  Admission for Inpatient Services


Order for Inpatient Services


The services are ordered in accordance with Medicare regulations or non-

Medicare payer requirements, as applicable.  In the case of services not 

specified as inpatient-only, they are appropriately provided as inpatient 

services in accordance with the 2-midnight benchmark.


Estimated LOS (days):  2


2 days is the estimated time the patient will need to remain in the hospital, 

assuming treatment plan goals are met and no additional complications.


Post-Hospital Plan:  Home











Erich Talbert MD 2018 09:59

## 2018-04-30 VITALS
OXYGEN SATURATION: 96 % | SYSTOLIC BLOOD PRESSURE: 128 MMHG | RESPIRATION RATE: 16 BRPM | HEART RATE: 109 BPM | TEMPERATURE: 97.8 F | DIASTOLIC BLOOD PRESSURE: 67 MMHG

## 2018-04-30 VITALS
SYSTOLIC BLOOD PRESSURE: 91 MMHG | OXYGEN SATURATION: 97 % | TEMPERATURE: 99 F | DIASTOLIC BLOOD PRESSURE: 50 MMHG | HEART RATE: 102 BPM | RESPIRATION RATE: 35 BRPM

## 2018-04-30 VITALS
TEMPERATURE: 96.5 F | HEART RATE: 109 BPM | OXYGEN SATURATION: 95 % | SYSTOLIC BLOOD PRESSURE: 114 MMHG | RESPIRATION RATE: 22 BRPM | DIASTOLIC BLOOD PRESSURE: 67 MMHG

## 2018-04-30 VITALS — HEART RATE: 90 BPM

## 2018-04-30 VITALS
DIASTOLIC BLOOD PRESSURE: 64 MMHG | OXYGEN SATURATION: 93 % | HEART RATE: 90 BPM | SYSTOLIC BLOOD PRESSURE: 100 MMHG | RESPIRATION RATE: 21 BRPM | TEMPERATURE: 97.7 F

## 2018-04-30 VITALS
DIASTOLIC BLOOD PRESSURE: 74 MMHG | RESPIRATION RATE: 23 BRPM | HEART RATE: 86 BPM | SYSTOLIC BLOOD PRESSURE: 113 MMHG | OXYGEN SATURATION: 96 %

## 2018-04-30 VITALS — OXYGEN SATURATION: 92 %

## 2018-04-30 VITALS — HEART RATE: 84 BPM

## 2018-04-30 VITALS
OXYGEN SATURATION: 96 % | TEMPERATURE: 97.8 F | HEART RATE: 88 BPM | DIASTOLIC BLOOD PRESSURE: 63 MMHG | SYSTOLIC BLOOD PRESSURE: 99 MMHG | RESPIRATION RATE: 28 BRPM

## 2018-04-30 VITALS — OXYGEN SATURATION: 98 %

## 2018-04-30 RX ADMIN — PROMETHAZINE HYDROCHLORIDE AND CODEINE PHOSPHATE PRN ML: 10; 6.25 SOLUTION ORAL at 16:14

## 2018-04-30 RX ADMIN — METHYLPREDNISOLONE SODIUM SUCCINATE SCH MG: 40 INJECTION, POWDER, FOR SOLUTION INTRAMUSCULAR; INTRAVENOUS at 17:31

## 2018-04-30 RX ADMIN — PROMETHAZINE HYDROCHLORIDE AND CODEINE PHOSPHATE PRN ML: 10; 6.25 SOLUTION ORAL at 04:29

## 2018-04-30 RX ADMIN — PROMETHAZINE HYDROCHLORIDE AND CODEINE PHOSPHATE PRN ML: 10; 6.25 SOLUTION ORAL at 09:30

## 2018-04-30 RX ADMIN — IPRATROPIUM BROMIDE AND ALBUTEROL SULFATE SCH AMPULE: .5; 3 SOLUTION RESPIRATORY (INHALATION) at 07:21

## 2018-04-30 RX ADMIN — GUAIFENESIN SCH MG: 600 TABLET, EXTENDED RELEASE ORAL at 08:25

## 2018-04-30 RX ADMIN — IPRATROPIUM BROMIDE AND ALBUTEROL SULFATE SCH AMPULE: .5; 3 SOLUTION RESPIRATORY (INHALATION) at 11:26

## 2018-04-30 RX ADMIN — IPRATROPIUM BROMIDE AND ALBUTEROL SULFATE SCH AMPULE: .5; 3 SOLUTION RESPIRATORY (INHALATION) at 15:47

## 2018-04-30 RX ADMIN — METHYLPREDNISOLONE SODIUM SUCCINATE SCH MG: 40 INJECTION, POWDER, FOR SOLUTION INTRAMUSCULAR; INTRAVENOUS at 12:24

## 2018-04-30 RX ADMIN — Medication SCH ML: at 08:25

## 2018-04-30 RX ADMIN — STANDARDIZED SENNA CONCENTRATE AND DOCUSATE SODIUM SCH TAB: 8.6; 5 TABLET, FILM COATED ORAL at 08:25

## 2018-04-30 RX ADMIN — METHYLPREDNISOLONE SODIUM SUCCINATE SCH MG: 40 INJECTION, POWDER, FOR SOLUTION INTRAMUSCULAR; INTRAVENOUS at 04:29

## 2018-04-30 NOTE — HHI.PR
Subjective


Remarks


Nursing denies any deterioration since last night.  Patient states he feels 

much better than yesterday.  Afebrile.





Objective





Vital Signs








  Date Time  Temp Pulse Resp B/P (MAP) Pulse Ox O2 Delivery O2 Flow Rate FiO2


 


4/30/18 11:51 96.5 109 22 114/67 (83) 95   


 


4/30/18 11:29     92   21


 


4/30/18 11:12     92   


 


4/30/18 09:50  90      


 


4/30/18 09:00  86 23 113/74 (87) 96   


 


4/30/18 08:00 97.7 105 21 100/64 (76) 93   


 


4/30/18 08:00  90      


 


4/30/18 07:22     98 Nasal Cannula 2.00 


 


4/30/18 04:30 97.8 88 28 99/63 (75) 96   


 


4/30/18 04:00  84      


 


4/30/18 00:00  102      


 


4/30/18 00:00 99.0 102 35 91/50 (64) 97   


 


4/29/18 20:00  132      


 


4/29/18 20:00 98.6 132 30 117/67 (84) 93   


 


4/29/18 19:20     93 Nasal Cannula 2.00 


 


4/29/18 18:55  100 29 102/48 (66) 96   


 


4/29/18 18:54  102 28 86/49 (61) 97   


 


4/29/18 18:00  104 27 109/65 (80)    


 


4/29/18 16:00  100      


 


4/29/18 15:00  90 26 114/68 (83) 95   














I/O      


 


 4/29/18 4/29/18 4/29/18 4/30/18 4/30/18 4/30/18





 07:00 15:00 23:00 07:00 15:00 23:00


 


Intake Total 3070 ml 0 ml 350 ml   


 


Output Total 1700 ml   1025 ml  


 


Balance 1370 ml 0 ml 350 ml -1025 ml  


 


      


 


Intake Oral 420 ml 0 ml    


 


IV Total 2650 ml  350 ml   


 


Output Urine Total 1700 ml   1025 ml  


 


# Bowel Movements 0     








Result Diagram:  


4/29/18 0532                                                                   

             4/29/18 0532





Objective Remarks


Clear lung sounds bilaterally, unlabored breathing, no acute distress, no 

cyanosis





A/P


Assessment and Plan





Hypoxia


-Resolved





Pneumonia


-Azithromycin and Rocephin


-Medrol Dosepak





Patient has been maximal benefit from hospitalization and is clinically stable 

for discharge.  Blood cultures are negative 2 days.











Erich Talbert MD Apr 30, 2018 14:19

## 2018-04-30 NOTE — HHI.DCPOC
Discharge Care Plan


Diagnosis:  


(1) COPD exacerbation


(2) Hypoxia


Goals to Promote Your Health


* To prevent worsening of your condition and complications


* To maintain your health at the optimal level


Directions to Meet Your Goals


*** Take your medications as prescribed


*** Follow your dietary instruction


*** Follow activity as directed








*** Keep your appointments as scheduled


*** Take your immunizations and boosters as scheduled


*** If your symptoms worsen call your PCP, if no PCP go to Urgent Care Center 

or Emergency Room***


*** Smoking is Dangerous to Your Health. Avoid second hand smoke***


***Call the 24-hour hour crisis hotline for domestic abuse at 1-882.102.2584***











Ricky Dc Apr 30, 2018 18:15